# Patient Record
Sex: MALE | Race: WHITE | NOT HISPANIC OR LATINO | URBAN - METROPOLITAN AREA
[De-identification: names, ages, dates, MRNs, and addresses within clinical notes are randomized per-mention and may not be internally consistent; named-entity substitution may affect disease eponyms.]

---

## 2019-08-05 ENCOUNTER — EMERGENCY (EMERGENCY)
Facility: HOSPITAL | Age: 34
LOS: 1 days | Discharge: HOME | End: 2019-08-05
Attending: EMERGENCY MEDICINE
Payer: COMMERCIAL

## 2019-08-05 VITALS
DIASTOLIC BLOOD PRESSURE: 100 MMHG | HEIGHT: 68 IN | OXYGEN SATURATION: 99 % | TEMPERATURE: 98 F | RESPIRATION RATE: 20 BRPM | SYSTOLIC BLOOD PRESSURE: 153 MMHG | HEART RATE: 80 BPM | WEIGHT: 186.07 LBS

## 2019-08-05 VITALS
TEMPERATURE: 99 F | HEART RATE: 78 BPM | SYSTOLIC BLOOD PRESSURE: 99 MMHG | RESPIRATION RATE: 16 BRPM | OXYGEN SATURATION: 99 % | DIASTOLIC BLOOD PRESSURE: 58 MMHG

## 2019-08-05 LAB
ALBUMIN SERPL ELPH-MCNC: 4.9 G/DL — SIGNIFICANT CHANGE UP (ref 3.3–5)
ALP SERPL-CCNC: 40 U/L — SIGNIFICANT CHANGE UP (ref 40–120)
ALT FLD-CCNC: 27 U/L — SIGNIFICANT CHANGE UP (ref 10–45)
ANION GAP SERPL CALC-SCNC: 20 MMOL/L — HIGH (ref 5–17)
AST SERPL-CCNC: 26 U/L — SIGNIFICANT CHANGE UP (ref 10–40)
BASOPHILS # BLD AUTO: 0 K/UL — SIGNIFICANT CHANGE UP (ref 0–0.2)
BASOPHILS NFR BLD AUTO: 0.1 % — SIGNIFICANT CHANGE UP (ref 0–2)
BILIRUB SERPL-MCNC: 0.5 MG/DL — SIGNIFICANT CHANGE UP (ref 0.2–1.2)
BUN SERPL-MCNC: 15 MG/DL — SIGNIFICANT CHANGE UP (ref 7–23)
CALCIUM SERPL-MCNC: 9.9 MG/DL — SIGNIFICANT CHANGE UP (ref 8.4–10.5)
CHLORIDE SERPL-SCNC: 99 MMOL/L — SIGNIFICANT CHANGE UP (ref 96–108)
CO2 SERPL-SCNC: 23 MMOL/L — SIGNIFICANT CHANGE UP (ref 22–31)
CREAT SERPL-MCNC: 0.85 MG/DL — SIGNIFICANT CHANGE UP (ref 0.5–1.3)
EOSINOPHIL # BLD AUTO: 0 K/UL — SIGNIFICANT CHANGE UP (ref 0–0.5)
EOSINOPHIL NFR BLD AUTO: 0.3 % — SIGNIFICANT CHANGE UP (ref 0–6)
GLUCOSE SERPL-MCNC: 119 MG/DL — HIGH (ref 70–99)
HCT VFR BLD CALC: 46.7 % — SIGNIFICANT CHANGE UP (ref 39–50)
HGB BLD-MCNC: 15.5 G/DL — SIGNIFICANT CHANGE UP (ref 13–17)
LYMPHOCYTES # BLD AUTO: 0.8 K/UL — LOW (ref 1–3.3)
LYMPHOCYTES # BLD AUTO: 10.9 % — LOW (ref 13–44)
MCHC RBC-ENTMCNC: 31.2 PG — SIGNIFICANT CHANGE UP (ref 27–34)
MCHC RBC-ENTMCNC: 33.2 GM/DL — SIGNIFICANT CHANGE UP (ref 32–36)
MCV RBC AUTO: 94 FL — SIGNIFICANT CHANGE UP (ref 80–100)
MONOCYTES # BLD AUTO: 0.2 K/UL — SIGNIFICANT CHANGE UP (ref 0–0.9)
MONOCYTES NFR BLD AUTO: 2.9 % — SIGNIFICANT CHANGE UP (ref 2–14)
NEUTROPHILS # BLD AUTO: 6.4 K/UL — SIGNIFICANT CHANGE UP (ref 1.8–7.4)
NEUTROPHILS NFR BLD AUTO: 85.8 % — HIGH (ref 43–77)
PLATELET # BLD AUTO: 195 K/UL — SIGNIFICANT CHANGE UP (ref 150–400)
POTASSIUM SERPL-MCNC: 4 MMOL/L — SIGNIFICANT CHANGE UP (ref 3.5–5.3)
POTASSIUM SERPL-SCNC: 4 MMOL/L — SIGNIFICANT CHANGE UP (ref 3.5–5.3)
PROT SERPL-MCNC: 7.2 G/DL — SIGNIFICANT CHANGE UP (ref 6–8.3)
RBC # BLD: 4.97 M/UL — SIGNIFICANT CHANGE UP (ref 4.2–5.8)
RBC # FLD: 11.8 % — SIGNIFICANT CHANGE UP (ref 10.3–14.5)
SODIUM SERPL-SCNC: 142 MMOL/L — SIGNIFICANT CHANGE UP (ref 135–145)
WBC # BLD: 7.4 K/UL — SIGNIFICANT CHANGE UP (ref 3.8–10.5)
WBC # FLD AUTO: 7.4 K/UL — SIGNIFICANT CHANGE UP (ref 3.8–10.5)

## 2019-08-05 PROCEDURE — 99284 EMERGENCY DEPT VISIT MOD MDM: CPT | Mod: 25

## 2019-08-05 PROCEDURE — 85027 COMPLETE CBC AUTOMATED: CPT

## 2019-08-05 PROCEDURE — 96375 TX/PRO/DX INJ NEW DRUG ADDON: CPT

## 2019-08-05 PROCEDURE — 70450 CT HEAD/BRAIN W/O DYE: CPT

## 2019-08-05 PROCEDURE — 99284 EMERGENCY DEPT VISIT MOD MDM: CPT

## 2019-08-05 PROCEDURE — 70450 CT HEAD/BRAIN W/O DYE: CPT | Mod: 26

## 2019-08-05 PROCEDURE — 96374 THER/PROPH/DIAG INJ IV PUSH: CPT

## 2019-08-05 PROCEDURE — 80053 COMPREHEN METABOLIC PANEL: CPT

## 2019-08-05 PROCEDURE — 96361 HYDRATE IV INFUSION ADD-ON: CPT

## 2019-08-05 PROCEDURE — 96376 TX/PRO/DX INJ SAME DRUG ADON: CPT

## 2019-08-05 RX ORDER — ONDANSETRON 8 MG/1
4 TABLET, FILM COATED ORAL ONCE
Refills: 0 | Status: COMPLETED | OUTPATIENT
Start: 2019-08-05 | End: 2019-08-05

## 2019-08-05 RX ORDER — DIAZEPAM 5 MG
5 TABLET ORAL ONCE
Refills: 0 | Status: DISCONTINUED | OUTPATIENT
Start: 2019-08-05 | End: 2019-08-05

## 2019-08-05 RX ORDER — METOCLOPRAMIDE HCL 10 MG
10 TABLET ORAL ONCE
Refills: 0 | Status: COMPLETED | OUTPATIENT
Start: 2019-08-05 | End: 2019-08-05

## 2019-08-05 RX ORDER — MECLIZINE HCL 12.5 MG
25 TABLET ORAL ONCE
Refills: 0 | Status: COMPLETED | OUTPATIENT
Start: 2019-08-05 | End: 2019-08-05

## 2019-08-05 RX ORDER — SODIUM CHLORIDE 9 MG/ML
1000 INJECTION INTRAMUSCULAR; INTRAVENOUS; SUBCUTANEOUS ONCE
Refills: 0 | Status: COMPLETED | OUTPATIENT
Start: 2019-08-05 | End: 2019-08-05

## 2019-08-05 RX ORDER — ONDANSETRON 8 MG/1
1 TABLET, FILM COATED ORAL
Qty: 10 | Refills: 0
Start: 2019-08-05 | End: 2019-08-14

## 2019-08-05 RX ORDER — MECLIZINE HCL 12.5 MG
1 TABLET ORAL
Qty: 21 | Refills: 0
Start: 2019-08-05 | End: 2019-08-11

## 2019-08-05 RX ADMIN — Medication 25 MILLIGRAM(S): at 10:55

## 2019-08-05 RX ADMIN — Medication 25 MILLIGRAM(S): at 16:45

## 2019-08-05 RX ADMIN — Medication 10 MILLIGRAM(S): at 16:45

## 2019-08-05 RX ADMIN — Medication 5 MILLIGRAM(S): at 13:19

## 2019-08-05 RX ADMIN — SODIUM CHLORIDE 2000 MILLILITER(S): 9 INJECTION INTRAMUSCULAR; INTRAVENOUS; SUBCUTANEOUS at 17:32

## 2019-08-05 RX ADMIN — ONDANSETRON 4 MILLIGRAM(S): 8 TABLET, FILM COATED ORAL at 10:12

## 2019-08-05 RX ADMIN — SODIUM CHLORIDE 1000 MILLILITER(S): 9 INJECTION INTRAMUSCULAR; INTRAVENOUS; SUBCUTANEOUS at 10:55

## 2019-08-05 RX ADMIN — ONDANSETRON 4 MILLIGRAM(S): 8 TABLET, FILM COATED ORAL at 09:38

## 2019-08-05 RX ADMIN — SODIUM CHLORIDE 2000 MILLILITER(S): 9 INJECTION INTRAMUSCULAR; INTRAVENOUS; SUBCUTANEOUS at 09:38

## 2019-08-05 NOTE — ED PROVIDER NOTE - PHYSICAL EXAMINATION
Gen: NAD, AOx3, able to make needs known, non-toxic  Head: NCAT  HEENT: EOMI, oral mucosa moist, normal conjunctiva, Rightward beating nystagmus on Leftward gauze w/ reproduction of symptoms. No nystagmus on Rightward gaze.  Lung: CTAB, no respiratory distress, no wheezes/rhonchi/rales B/L, speaking in full sentences  CV: RRR, no murmurs  Abd: soft, NTND, no guarding, no CVA tenderness  MSK: no visible deformities  Neuro: No focal sensory or motor deficits  Skin: Warm, well perfused  Psych: normal affect

## 2019-08-05 NOTE — ED ADULT TRIAGE NOTE - CHIEF COMPLAINT QUOTE
dizziness, V/D since 2300 last night (+10 episodes of each). denies abdominal pain, chest pain, SOB, fevers, chills, sick contacts or recent travel

## 2019-08-05 NOTE — ED ADULT NURSE NOTE - OBJECTIVE STATEMENT
34 y m came to the ed c/o dizziness, n/v. patient states feeling dizzy yesterday around 2pm when he stood up too quickly. says the symptoms self resolved and he did not think anything about it. later on that night says he developed dizziness again so severe he felt nauseous and even vomited multiple times. says symptoms persisted into this morning so he came to the ed. patient is a/ox3. denies any fevers, chills, chest pain, sob. skin is warm and dry.

## 2019-08-05 NOTE — ED PROVIDER NOTE - NSFOLLOWUPCLINICS_GEN_ALL_ED_FT
[facility]  Neurology  [address] [place] [address] - [address] [place] [address]  Phone: [phone]  Fax:   Follow Up Time: 4-6 Days

## 2019-08-05 NOTE — ED PROVIDER NOTE - ATTENDING CONTRIBUTION TO CARE
****ATTENDING**** 35yo m no pmhx presents w dizziness yesterday. States since last night pt has dizziness w movement. No HA, change in vision. +n/v mult times. No cp,palp,sob.   Pt has allergies, no uri.  On exam, Patient is awake,alert,oriented x 3. Patient is well appearing and in no acute distress. PERRL, EOMI. Horizontal nystagmus.  Patient's chest is clear to ausculation, +s1s2. Abdomen is soft nd/nt +BS. Extremity with no swelling or calf tenderness. CN2-12 intact 5/5 U/LE nml sensation.  DDx peripheral vertigo. re eval.

## 2019-08-05 NOTE — ED PROVIDER NOTE - CLINICAL SUMMARY MEDICAL DECISION MAKING FREE TEXT BOX
35 y/o w/ no sig PMH presenting w/ dizziness. Symptoms consistent with peripheral vertigo, especially in young well appearing pt w/ no sig PMH. Will treat symptomatically and reassess.

## 2019-08-05 NOTE — ED PROVIDER NOTE - NS ED ROS FT
GENERAL: No fever or chills  EYES: No change in vision  HEENT: No trouble swallowing or speaking  CARDIAC: No chest pain  PULMONARY: No cough or SOB  GI: No abdominal pain, +nausea and vomiting, no diarrhea or constipation  : No changes in urination  SKIN: No rashes  NEURO: +dizzness, No headache, no numbness  MSK: No joint pain  Otherwise as HPI or negative.

## 2019-08-05 NOTE — ED PROVIDER NOTE - NSFOLLOWUPINSTRUCTIONS_ED_ALL_ED_FT
Please return to the ED if you develop worsening dizziness, vomiting, headaches, chest pain, abdominal pain or any other concerning symptoms     What are dizziness and vertigo? — Dizziness is a feeling that is sometimes hard to describe. It often makes you feel like you are about to fall or pass out. Dizziness can also cause you to feel lightheaded or make it hard for you to walk straight.    Vertigo is a type of dizziness that makes you feel like you are spinning, swaying, or tilting, or like the room is moving around you. These feelings come and go, and might last seconds, hours, or days. You might feel worse when you move your head, change positions, cough, or sneeze.    Some people with vertigo have trouble walking. Some people with vertigo have nausea and might vomit.    What causes vertigo? — The most common causes of vertigo include:    ?Inner ear problems – Deep inside the ear, there is a small network of tubes that are filled with fluid. Floating inside that fluid are special calcium deposits. Together, these tubes and deposits make up the "vestibular system." This system tells the brain what position the body is in. It also helps keep you balanced (figure 1).      If the tubes inside your inner ear get swollen, or if they form extra calcium deposits, you can develop vertigo and balance problems. You can also get vertigo if swelling puts pressure on nerves in the inner ear. Sometimes this swelling is caused by a viral infection.      ?Head injury – Head injuries and concussion can disturb the inner ear and lead to vertigo.      ?Medicines – Some medicines can damage the inner ear and cause vertigo.      ?Migraine headaches – Migraine headaches can sometimes cause vertigo.      ?Brain problems – Brain problems, such as stroke or multiple sclerosis, can also cause vertigo.      Should I see a doctor or nurse? — See your doctor or nurse right away if you have vertigo and:    ?Have a new or severe headache    ?Have a fever higher than 100.4ºF (38ºC)    ?Start to see double or have trouble seeing clearly    ?Have trouble speaking or hearing    ?Have weakness in an arm or leg or your face droops to one side    ?Pass out    ?Have numbness or tingling    ?Have chest pain    ?Cannot stop vomiting      You should also see your doctor or nurse if you have vertigo that lasts for several minutes or more and you:    ?Are older than 60    ?Had a stroke in the past    ?Are at risk for having a stroke, for example because you have diabetes or you smoke      If you have dizziness or vertigo that comes and goes but you do not have any of the problems listed above, make an appointment with your doctor or nurse.    Will I need tests? — Maybe. Your doctor will start by learning about your symptoms and doing an exam. During the exam, he or she will check:    ?Your hearing    ?How you walk and keep your balance    ?How your eyes work when you watch a moving object, and when your head is turned from side to side      Depending on what your doctor finds during the exam, he or she might order more tests to better understand your hearing or balance problems. In some cases, the doctor will order an MRI of your brain. An MRI is an imaging test that creates pictures of the inside of your body.    How is vertigo treated? — If your doctor knows what is causing your vertigo, he or she will probably try to treat that problem directly. For instance, if you have calcium deposits in your inner ear, the doctor might try to get them out by moving your head in a special way.    Your doctor can also give you medicines that might help your vertigo and relieve nausea and vomiting.    If your vertigo is really bad, your doctor might also suggest a treatment called "balance rehabilitation." This treatment teaches you exercises that can help you cope with your vertigo.    What can I do on my own to deal with my vertigo? — If you have trouble standing or walking because of vertigo, you are at risk of falling. To reduce the risk of falls, make your home as safe as possible. Get rid of loose electrical cords, clutter, and slippery rugs. Also, make sure that you wear sturdy, non-slip shoes, and that your walkways are clear and well lit.

## 2019-08-05 NOTE — ED ADULT NURSE NOTE - NSIMPLEMENTINTERV_GEN_ALL_ED
Implemented All Universal Safety Interventions:  [place] to call system. Call bell, personal items and telephone within reach. Instruct patient to call for assistance. Room bathroom lighting operational. Non-slip footwear when patient is off stretcher. Physically safe environment: no spills, clutter or unnecessary equipment. Stretcher in lowest position, wheels locked, appropriate side rails in place.

## 2019-08-05 NOTE — ED PROVIDER NOTE - PROGRESS NOTE DETAILS
Dr. Qasim Gongora, PGY-2: symptoms improved. PO challenging at this time Dr. Qasim Gongora, PGY-2: symptoms returning. 5 mg of valium was ordered Estephanie Kelly MD  pt reports feeling improved, is able to stand and ambulate w/out distress. Estephanie Kelly MD  pt reports feeling improved, is able to stand and ambulate w/out distress. States he feels well enough to go home at this time. Pt, wife and brother were counselled on return precautions

## 2019-08-05 NOTE — ED PROVIDER NOTE - OBJECTIVE STATEMENT
33 y/o M w/ no sig PMH presenting w/ a complaint of dizziness. Pt presents with wife and mother-in-law. Pt reports developing some mild dizziness yesterday that resolved. He fell asleep on the couch and when he woke up to go to his bed he noticed he was very dizzy, describing the feeling as the room was spinning. Began to feel nauseous as well. Since 11pm last evening he has had multiple episodes of non bilious, non bloody vomiting. Symptoms are worse w/ movement, particularly lying on his R side. Symptoms improve w/ lying on L side. Has not taken any meds at home. Denies this ever happening before. No additional complaints.
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
